# Patient Record
Sex: FEMALE | Race: WHITE | Employment: FULL TIME | URBAN - METROPOLITAN AREA
[De-identification: names, ages, dates, MRNs, and addresses within clinical notes are randomized per-mention and may not be internally consistent; named-entity substitution may affect disease eponyms.]

---

## 2022-01-19 ENCOUNTER — HOSPITAL ENCOUNTER (OUTPATIENT)
Age: 52
Discharge: HOME | End: 2022-01-19
Payer: COMMERCIAL

## 2022-01-19 ENCOUNTER — HOSPITAL ENCOUNTER (OUTPATIENT)
Dept: GENERAL RADIOLOGY | Age: 52
Discharge: HOME | End: 2022-01-19
Payer: COMMERCIAL

## 2022-01-19 DIAGNOSIS — Z20.822 EXPOSURE TO 2019 NOVEL CORONAVIRUS: ICD-10-CM

## 2022-01-19 PROCEDURE — 71046 X-RAY EXAM CHEST 2 VIEWS: CPT

## 2022-08-19 ENCOUNTER — APPOINTMENT (OUTPATIENT)
Dept: GENERAL RADIOLOGY | Age: 52
End: 2022-08-19
Payer: COMMERCIAL

## 2022-08-19 ENCOUNTER — HOSPITAL ENCOUNTER (EMERGENCY)
Age: 52
Discharge: HOME | End: 2022-08-19
Payer: COMMERCIAL

## 2022-08-19 VITALS
BODY MASS INDEX: 55.32 KG/M2 | OXYGEN SATURATION: 96 % | WEIGHT: 293 LBS | TEMPERATURE: 98.2 F | HEIGHT: 61 IN | DIASTOLIC BLOOD PRESSURE: 80 MMHG | SYSTOLIC BLOOD PRESSURE: 130 MMHG | RESPIRATION RATE: 18 BRPM | HEART RATE: 80 BPM

## 2022-08-19 DIAGNOSIS — M79.671 RIGHT FOOT PAIN: Primary | ICD-10-CM

## 2022-08-19 DIAGNOSIS — S93.601A RIGHT FOOT SPRAIN, INITIAL ENCOUNTER: ICD-10-CM

## 2022-08-19 PROCEDURE — 73630 X-RAY EXAM OF FOOT: CPT

## 2022-08-19 PROCEDURE — 73610 X-RAY EXAM OF ANKLE: CPT

## 2022-08-19 PROCEDURE — 99283 EMERGENCY DEPT VISIT LOW MDM: CPT

## 2022-08-19 RX ORDER — NAPROXEN 500 MG/1
500 TABLET ORAL 2 TIMES DAILY WITH MEALS
Qty: 30 TABLET | Refills: 0 | Status: SHIPPED | OUTPATIENT
Start: 2022-08-19

## 2022-08-19 ASSESSMENT — ENCOUNTER SYMPTOMS
ABDOMINAL PAIN: 0
COUGH: 0
DIARRHEA: 0
SHORTNESS OF BREATH: 0
BACK PAIN: 0
COLOR CHANGE: 0
VOMITING: 0
NAUSEA: 0
WHEEZING: 0

## 2022-08-19 NOTE — ED PROVIDER NOTES
**ADVANCED PRACTICE PROVIDER, I HAVE EVALUATED THIS PATIENT**        [address] [facility]      Pt Name: Jacque Brian  [facility]:[id number]  [place] 1970  Date of evaluation: 8/19/2022  Provider: KAYLEN Perez CNP      Chief Complaint:    Chief Complaint   Patient presents with    Foot Injury     Pt tripped over her dog and her R foot went backwards. Pt repots hse her a crack. Pt is able to  her toes. Pedal pulse felt. Pt reports burning and pain radiating into ankle. Nursing Notes, Past Medical Hx, Past Surgical Hx, Social Hx, Allergies, and Family Hx were all reviewed and agreed with or any disagreements were addressed in the HPI.    HPI: (Location, Duration, Timing, Severity, Quality, Assoc Sx, Context, Modifying factors)    Chief Complaint of right foot pain    This is a  47 y.o. female who presents to the emergency department with right foot and ankle pain, patient states a few prior to ED arrival she was stepping over her dog when her dog tried to stand up only for her to fall and her foot felt like it cracked in the middle of her foot, she is having a hard time flexing and pointing her toes due to the pain. She denies any additional injuries or complaints, she does report that the pain goes into her right lateral ankle. She denies any numbness tingling or paresthesias, she rates the pain a 6 out of 10, any movement worsens the pain. She denies any injuries or complaints, no additional aggravating relieving factors, she has not taken any medicine for the pain.   She presents awake, alert and in no acute respiratory distress or toxic appearance    PastMedical/Surgical History:      Diagnosis Date    Hyperlipidemia     Hypertension          Procedure Laterality Date    CARPAL TUNNEL RELEASE      TONSILLECTOMY      TUBAL LIGATION         Medications:  Previous Medications    CARBAMAZEPINE (TEGRETOL) 200 MG TABLET    Take 200 mg by mouth 3 times daily. FLUTICASONE-SALMETEROL (ADVAIR HFA) 115-21 MCG/ACT INHALER    Inhale 2 puffs into the lungs 2 times daily. PAROXETINE (PAXIL CR) 25 MG CR TABLET    Take 25 mg by mouth every morning. Review of Systems:  (2-9 systems needed)  Review of Systems   Constitutional:  Negative for chills and fever. HENT:  Negative for congestion. Respiratory:  Negative for cough, shortness of breath and wheezing. Cardiovascular:  Negative for chest pain. Gastrointestinal:  Negative for abdominal pain, diarrhea, nausea and vomiting. Genitourinary:  Negative for dysuria and frequency. Musculoskeletal:  Positive for arthralgias and joint swelling. Negative for back pain. Patient presents with right foot and ankle pain, patient states a few prior to ED arrival she was stepping over her dog when her dog tried to stand up only for her to fall and her foot felt like it cracked in the middle of her foot, she is having a hard time flexing and pointing her toes due to the pain. Skin:  Negative for color change. Neurological:  Negative for weakness, numbness and headaches. \"Positives and Pertinent negatives as per HPI\"    Physical Exam:  Physical Exam  Vitals and nursing note reviewed. Constitutional:       Appearance: She is well-developed. She is not diaphoretic. HENT:      Head: Normocephalic. Right Ear: External ear normal.      Left Ear: External ear normal.   Eyes:      General:         Right eye: No discharge. Left eye: No discharge. Cardiovascular:      Rate and Rhythm: Normal rate. Pulmonary:      Effort: Pulmonary effort is normal. No respiratory distress. Musculoskeletal:         General: Tenderness and signs of injury present. No deformity. Normal range of motion. Cervical back: Normal range of motion and neck supple.       Comments: Patient has reproducible tenderness across the entire right foot the first through fifth metatarsal and right lateral ankle however there is no open wounds, lacerations, abrasions. Compartments in the right lower extremity are soft however she does have chronic bilateral lower extremity edema. She is neurovascular neurologically intact, no obvious fracture dislocation   Skin:     General: Skin is warm. Capillary Refill: Capillary refill takes less than 2 seconds. Coloration: Skin is not pale. Neurological:      Mental Status: She is alert and oriented to person, place, and time. GCS: GCS eye subscore is 4. GCS verbal subscore is 5. GCS motor subscore is 6. Psychiatric:         Behavior: Behavior normal.       MEDICAL DECISION MAKING    Vitals:    Vitals:    08/19/22 1525 08/19/22 1532 08/19/22 1533 08/19/22 1542   BP:   130/80    Pulse: 80      Resp: 18      Temp:    98.2 °F (36.8 °C)   TempSrc: Oral   Oral   SpO2: 96%      Weight:  300 lb (136.1 kg)     Height:  5' 1\" (1.549 m)         LABS:Labs Reviewed - No data to display     Remainder of labs reviewed and were negative at this time or not returned at the time of this note. RADIOLOGY:   Non-plain film images such as CT, Ultrasound and MRI are read by the radiologist. Padmini KRAMER APRN - CNP have directly visualized the radiologic plain film image(s) with the below findings:      Interpretation per the Radiologist below, if available at the time of this note:    XR ANKLE RIGHT (MIN 3 VIEWS)   Final Result   No acute osseous abnormality. XR FOOT RIGHT (MIN 3 VIEWS)   Final Result   No acute osseous abnormality.               MEDICAL DECISION MAKING / ED COURSE:      PROCEDURES:   Procedures    None    Patient was given:  Medications - No data to display    Patient presents with right foot and ankle pain, patient states a few prior to ED arrival she was stepping over her dog when her dog tried to stand up only for her to fall and her foot felt like it cracked in the middle of her foot, she is having a hard time flexing and pointing her toes due to the pain. After evaluation anesthesia the patient I ordered an x-ray of the right foot and ankle, I did offer her Tylenol and/or Motrin however she states \"I am fine right now\". Right ankle x-ray shows no acute osseous abnormality. Right foot x-ray shows no acute osseous abnormality. Upon reevaluation vital signs are stable, I do believe she has a sprain, she was placed in an Ace wrap and Ortho shoe however, at this time no concern for acute fracture, dislocation, DVT, septic joint or other emergent etiology. Therefore, shared medical decision was made between the patient and myself and we agreed the patient would be discharged home with outpatient follow-up. Patient was discharged home with referral back to orthopedic specialist, call today make an appointment for follow-up to be seen next week. Educated about RICE, take anti-inflammatories as prescribed. Return to the ER for worsening or concerning symptoms. The patient tolerated their visit well. I evaluated the patient. The physician was available for consultation as needed. The patient and / or the family were informed of the results of any tests, a time was given to answer questions, a plan was proposed and they agreed with plan. Patient verbalized understanding of discharge instructions and the patient was discharged from the department in stable condition. CLINICAL IMPRESSION:  1. Right foot pain    2.  Right foot sprain, initial encounter        DISPOSITION Decision To Discharge 08/19/2022 04:38:21 PM      PATIENT REFERRED TO:  DO Ruth Squires 39.  [phone]    Schedule an appointment as soon as possible for a visit   As needed    Franklin Barker MD  [address] [place] [address],[address]  [address] [place] [address]  [phone]      Follow-up with any orthopedic specialist on Monday or Tuesday of next week for reevaluation    [facility] Emergency Department  [place] [address] [address]  [phone]  Go to   If symptoms worsen    DISCHARGE MEDICATIONS:  New Prescriptions    NAPROXEN (NAPROSYN) 500 MG TABLET    Take 1 tablet by mouth 2 times daily (with meals)       DISCONTINUED MEDICATIONS:  Discontinued Medications    No medications on file              (Please note the MDM and HPI sections of this note were completed with a voice recognition program.  Efforts were made to edit the dictations but occasionally words are mis-transcribed.)    Electronically signed, KAYLEN Lin CNP,           KAYLEN Lin CNP  08/19/22 [address] [place] [address]

## 2024-10-08 ENCOUNTER — OFFICE VISIT (OUTPATIENT)
Dept: ORTHOPEDIC SURGERY | Age: 54
End: 2024-10-08
Payer: COMMERCIAL

## 2024-10-08 VITALS — WEIGHT: 293 LBS | BODY MASS INDEX: 55.32 KG/M2 | HEIGHT: 61 IN

## 2024-10-08 DIAGNOSIS — G89.29 CHRONIC PAIN OF BOTH KNEES: Primary | ICD-10-CM

## 2024-10-08 DIAGNOSIS — M25.561 CHRONIC PAIN OF BOTH KNEES: Primary | ICD-10-CM

## 2024-10-08 DIAGNOSIS — M25.562 CHRONIC PAIN OF BOTH KNEES: Primary | ICD-10-CM

## 2024-10-08 DIAGNOSIS — M17.0 ARTHRITIS OF BOTH KNEES: ICD-10-CM

## 2024-10-08 PROCEDURE — 20610 DRAIN/INJ JOINT/BURSA W/O US: CPT | Performed by: ORTHOPAEDIC SURGERY

## 2024-10-08 PROCEDURE — 99203 OFFICE O/P NEW LOW 30 MIN: CPT | Performed by: ORTHOPAEDIC SURGERY

## 2024-10-08 RX ORDER — TRIAMCINOLONE ACETONIDE 40 MG/ML
40 INJECTION, SUSPENSION INTRA-ARTICULAR; INTRAMUSCULAR ONCE
Status: COMPLETED | OUTPATIENT
Start: 2024-10-08 | End: 2024-10-08

## 2024-10-08 RX ORDER — LIDOCAINE HYDROCHLORIDE 10 MG/ML
4 INJECTION, SOLUTION INFILTRATION; PERINEURAL ONCE
Status: COMPLETED | OUTPATIENT
Start: 2024-10-08 | End: 2024-10-08

## 2024-10-08 RX ORDER — BUPIVACAINE HYDROCHLORIDE 2.5 MG/ML
4 INJECTION, SOLUTION INFILTRATION; PERINEURAL ONCE
Status: COMPLETED | OUTPATIENT
Start: 2024-10-08 | End: 2024-10-08

## 2024-10-08 RX ADMIN — LIDOCAINE HYDROCHLORIDE 4 ML: 10 INJECTION, SOLUTION INFILTRATION; PERINEURAL at 11:12

## 2024-10-08 RX ADMIN — BUPIVACAINE HYDROCHLORIDE 10 MG: 2.5 INJECTION, SOLUTION INFILTRATION; PERINEURAL at 11:12

## 2024-10-08 RX ADMIN — TRIAMCINOLONE ACETONIDE 40 MG: 40 INJECTION, SUSPENSION INTRA-ARTICULAR; INTRAMUSCULAR at 11:13

## 2024-10-08 NOTE — PROGRESS NOTES
lesion.  Bone-on-bone medial compartment narrowing bilaterally.  Tricompartmental osteophytes.  Mild to moderate patellofemoral narrowing bilaterally.      Assessment:     Bilateral knee osteoarthritis  Class III obesity (BMI 68.02)   Hypertension      Plan:     Natural history and expected course discussed. Questions answered.     Non surgical options including cortisone injection, Visco supplementation injection, Coolief (cooled frequency ablation of the geniculate nerves), stem cell injections, PRP injections were discussed. Surgical option of total knee arthroplasty discussed.    Ice prn.    NSAIDs prn.  The patient was advised that NSAID-type medications have two very important potential side effects: gastrointestinal irritation including hemorrhage and renal injuries. She was asked to take the medication with food and to stop if she experiences any GI upset. I asked her to call for vomiting, abdominal pain or black/bloody stools. The patient expresses understanding of these issues and questions were answered.    The risks and benefits of an injection were discussed with the patient.  The patient had full opportunity to ask questions and all were answered.  The patient then provided verbal informed consent.  The skin was then prepped with betadine solution and alcohol.  Under aseptic conditions, the right knee was injected with 4cc of 1% xylocaine, 4cc of 0.25% marcaine, and 1cc of Kenalog (40mg/ml).  There were no immediate complications following the injection.  The patient was advised of the possibility of injection site reaction and instructed to apply ice to the area and take NSAIDs if able.      Knee strengthening.  Discussed low impact activity.    Weight loss.    AAOS knee arthritis handout provided.    Follow up 3 months prn or sooner if she would like to try injection in the left knee.              David Waters MD  Orthopaedic Surgery and Sports Medicine     Disclaimer:  This note was generated